# Patient Record
(demographics unavailable — no encounter records)

---

## 2024-10-09 NOTE — REVIEW OF SYSTEMS
[Dizziness] : no dizziness [Tremors] : tremors [Pain/Numbness of Digits] : pain/numbness of digits [Negative] : Heme/Lymph

## 2024-10-09 NOTE — ASSESSMENT
[Diabetes Foot Care] : diabetes foot care [Long Term Vascular Complications] : long term vascular complications of diabetes [Carbohydrate Consistent Diet] : carbohydrate consistent diet [Importance of Diet and Exercise] : importance of diet and exercise to improve glycemic control, achieve weight loss and improve cardiovascular health [Exercise/Effect on Glucose] : exercise/effect on glucose [Hypoglycemia Management] : hypoglycemia management [Self Monitoring of Blood Glucose] : self monitoring of blood glucose [Retinopathy Screening] : Patient was referred to ophthalmology for retinopathy screening [FreeTextEntry1] : Middle-aged white male with a past medical history of a type 2 diabetes currently on glipizide 2.5 mg twice a day and Synjardy XR 12.5/1000 mg once a day.  Patient recently had a blood work done which showed that the hemoglobin A1c was 7.7, TSH was 2.11,.  His complete metabolic panel is normal.  Patient with uncontrolled type 2 diabetes most likely due to lack of taking 2 tablets of the Synjardy as HE was supposed to take before.  No history of any hypoglycemia.  Patient appears to be well compliant with his diet and physically is quite active.  His vision has been stable.  And triglycerides of 95 mg per DL.  Urine test is normal..  Recommendation 1.  I have advised the patient to increase the Synjardy XR to 12.5/1000 mg tablets 2 tablets daily.  Patient will continue with the glipizide 2.5 mg twice a day 2.  The importance of strict diet exercise and control of the weight was discussed with the patient. 3.  Patient is encouraged to monitor his sugar levels 2 hours after his main meal with a goal of maintaining it less than 160 mg per DL 4.  Patient will continue with the rest of the medication which include vitamin D2, baby aspirin, omeprazole and simvastatin. 5.  If the patient remains clinically stable he will follow-up in 3 months time with a repeat set of lab test.  The plan was discussed with the patient and the spouse.  Thank you

## 2024-10-09 NOTE — HISTORY OF PRESENT ILLNESS
[FreeTextEntry1] : 77-year-old male with a medical history of type 2 diabetes present for a routine follow up visit. Patient is compliant with taking medications of Synjardy, Omeprazole, vitamin D, Multi vit, Glipizide, and aspirin 80mg. Patient has a complaint of tremors of his hand with sensations of tingling of his fingertips. Patient denies any other significant symptoms of chest pain, sob, nausea or vomiting, palpitations, difficulty swallowing, skin rashes or infections. Patient has not any recent falls or fractures. Circulation of the lower extremities remains stable bilateral; he does follow up with a podiatrist. No numbness or tingling of the feet. His blood glucose in the morning range up to 120. Patient does not check his glucose in the evening. Denies any vision changes, review of systems otherwise is negative patient has been taking only 1 tablet of the Synjardy XR instead of 2 tablets daily.  Also he is off the simvastatin.  He is appetite is good and he denies any symptoms of nausea vomiting abdominal pain or any dysuria Patient has been taking his synjardy once a day, instructed patient to take medication 2x a day  Patient will begin simvastatin again

## 2024-10-09 NOTE — PHYSICAL EXAM
[Alert] : alert [Well Nourished] : well nourished [No Acute Distress] : no acute distress [Well Developed] : well developed [Normal Sclera/Conjunctiva] : normal sclera/conjunctiva [EOMI] : extra ocular movement intact [No Proptosis] : no proptosis [Normal Oropharynx] : the oropharynx was normal [Thyroid Not Enlarged] : the thyroid was not enlarged [No Thyroid Nodules] : no palpable thyroid nodules [No Respiratory Distress] : no respiratory distress [No Accessory Muscle Use] : no accessory muscle use [Clear to Auscultation] : lungs were clear to auscultation bilaterally [Normal S1, S2] : normal S1 and S2 [Normal Rate] : heart rate was normal [Regular Rhythm] : with a regular rhythm [No Edema] : no peripheral edema [Pedal Pulses Normal] : the pedal pulses are present [Normal Bowel Sounds] : normal bowel sounds [Not Tender] : non-tender [Not Distended] : not distended [Soft] : abdomen soft [Normal Anterior Cervical Nodes] : no anterior cervical lymphadenopathy [Normal Posterior Cervical Nodes] : no posterior cervical lymphadenopathy [No Spinal Tenderness] : no spinal tenderness [Spine Straight] : spine straight [No Stigmata of Cushings Syndrome] : no stigmata of Cushings Syndrome [Normal Gait] : normal gait [Normal Strength/Tone] : muscle strength and tone were normal [No Rash] : no rash [Acanthosis Nigricans] : no acanthosis nigricans [Normal Reflexes] : deep tendon reflexes were 2+ and symmetric [Oriented x3] : oriented to person, place, and time [de-identified] : His BMI is 27.5 [de-identified] :  HORIZONTAL TREMOR OF THE LEFT HAND

## 2025-01-21 NOTE — HISTORY OF PRESENT ILLNESS
[FreeTextEntry1] : 77-year-old white male who has a past medical history of a type 2 diabetes, hypertension, tremors and hyperlipidemia presents for routine follow-up.  Patient is currently taking Synjardy XR 1012.5/1000 mg twice a day and glipizide 2.5 mg twice a day.  Patient also has a history of low vitamin B12, and nontoxic diffuse goiter, vitamin D deficiency and essential tremor.  Patient claimed that he is feeling quite well.  He is fairly well compliant with his diet and exercise.  He recently had received an injection into the left shoulder for pain after which his glucose levels had spiked a little bit but he had developed mild polyuria and polydipsia.  He denies any symptoms of hypoglycemia or numbness of extremities.  His vision has been stable.  He denies any chest pain, shortness of breath, nausea vomiting, or any dysuria,.  His glucose levels at home in the morning they range between 120 to 130 mg per DL and after the meals they will rise up to 200 mg per DL.  Patient recently has become more compliant with his diet

## 2025-01-21 NOTE — REVIEW OF SYSTEMS
[Fatigue] : no fatigue [Decreased Appetite] : appetite not decreased [Recent Weight Loss (___ Lbs)] : recent weight loss: [unfilled] lbs [Chest Pain] : no chest pain [Polyuria] : no polyuria [Dysuria] : no dysuria [Nocturia] : nocturia [Joint Pain] : joint pain [Joint Stiffness] : joint stiffness [Headaches] : no headaches [Dizziness] : no dizziness [Confusion] : no confusion [Tremors] : tremors [Pain/Numbness of Digits] : no pain/numbness of digits [Negative] : Heme/Lymph [FreeTextEntry9] : Left shoulder pain [de-identified] : A patchy pruritic rash over the lower extremities.

## 2025-01-21 NOTE — PHYSICAL EXAM
[Alert] : alert [Well Nourished] : well nourished [No Acute Distress] : no acute distress [Well Developed] : well developed [Normal Sclera/Conjunctiva] : normal sclera/conjunctiva [EOMI] : extra ocular movement intact [No Proptosis] : no proptosis [Normal Oropharynx] : the oropharynx was normal [Thyroid Not Enlarged] : the thyroid was not enlarged [No Thyroid Nodules] : no palpable thyroid nodules [No Respiratory Distress] : no respiratory distress [No Accessory Muscle Use] : no accessory muscle use [Clear to Auscultation] : lungs were clear to auscultation bilaterally [Normal S1, S2] : normal S1 and S2 [Normal Rate] : heart rate was normal [Regular Rhythm] : with a regular rhythm [No Edema] : no peripheral edema [Pedal Pulses Normal] : the pedal pulses are present [Normal Bowel Sounds] : normal bowel sounds [Not Tender] : non-tender [Not Distended] : not distended [Soft] : abdomen soft [Normal Anterior Cervical Nodes] : no anterior cervical lymphadenopathy [Normal Posterior Cervical Nodes] : no posterior cervical lymphadenopathy [No Spinal Tenderness] : no spinal tenderness [Spine Straight] : spine straight [No Stigmata of Cushings Syndrome] : no stigmata of Cushings Syndrome [Normal Gait] : normal gait [Normal Strength/Tone] : muscle strength and tone were normal [Acanthosis Nigricans] : no acanthosis nigricans [No Motor Deficits] : the motor exam was normal [No Sensory Deficits] : the sensory exam was normal to light touch and pinprick [Normal Reflexes] : deep tendon reflexes were 2+ and symmetric [No Tremors] : no tremors [Oriented x3] : oriented to person, place, and time [de-identified] : Patient BMI is 27 [de-identified] : Patchy maculopapular rash over the lower extremities extending to the mid calf area

## 2025-01-21 NOTE — ASSESSMENT
[Diabetes Foot Care] : diabetes foot care [Long Term Vascular Complications] : long term vascular complications of diabetes [Carbohydrate Consistent Diet] : carbohydrate consistent diet [Importance of Diet and Exercise] : importance of diet and exercise to improve glycemic control, achieve weight loss and improve cardiovascular health [Exercise/Effect on Glucose] : exercise/effect on glucose [Hypoglycemia Management] : hypoglycemia management [Self Monitoring of Blood Glucose] : self monitoring of blood glucose [Retinopathy Screening] : Patient was referred to ophthalmology for retinopathy screening [FreeTextEntry1] : Middle-aged white male with a past medical history of a type 2 diabetes, hypertension, and hyperlipidemia presents for routine follow-up.  Patient recently had a blood test in December 2024 which showed that the complete metabolic panel is normal, estimated GFR is 74 fasting glucose was 113 mg per DL, hemoglobin A1c was 6.7%, total cholesterol of 164, LDL cholesterol is 95 and the triglycerides were 94 mg per DL.  Patient with stable glycemic control, compliant with his diet and exercise with mild nocturia and a pruritic rash over the lower extremities recommendation 1.  I have advised the patient to continue with the Synjardy XR 12.5/1000 mg 1 tablet twice daily, glipizide 2.5 mg twice daily.  Patient is aware of the possibility of hypoglycemic episodes and is capable of recognizing the early symptoms and is able to manage it with extra sugar calories 2.  The importance of a strict low-carb and low-fat diet was discussed with the patient in combination with moderate exercise. 3.  I will give the patient a trial with nystatin with triamcinolone ointment for relief of the skin rash. 4.  Patient is planning to leave for [place] for 3 months after which he will return for follow-up evaluation.  Patient will require a repeat blood test before his next visit.  The plan was discussed with the patient thank you